# Patient Record
(demographics unavailable — no encounter records)

---

## 2024-10-29 NOTE — HISTORY OF PRESENT ILLNESS
[FreeTextEntry1] : Pt states he is here to estblish care. [de-identified] : 52M Chadian-speaking only with PMHx of HTN, HLD w/o prior hospitalizations, w/o prior surgeries, and on no medications right now, presenting to establish care with no acute clinical complaints or requests.  - Notably, he was hypertensive today to 170-180s/90s, but asymptomatic w/o headaches, dizziness, epistaxis, chest pain, dyspnea, SOB, neurologic changes, and he denies experiencing any of these symptoms recently. - In terms of HTN, claims that it's been high for years and was previously on meds Rx'd by unknown doctor in [place], but hasn't taken them in a while. - In terms of HLD, similarly states he was on meds by same doctor, but unsure what exactly and hasn't taken them in long time.

## 2024-10-29 NOTE — PHYSICAL EXAM
[Normal] : affect was normal and insight and judgment were intact [de-identified] : small white patch on iris on R eye, patient says is chronic

## 2024-10-29 NOTE — ASSESSMENT
[FreeTextEntry1] : 52M [place]ian-speaking only with PMHx of HTN, HLD w/o prior hospitalizations, w/o prior surgeries, and on no medications right now, presenting to [facility] care with no acute clinical complaints or requests  #HTN - having hypertensive urgency today, with subjective hx of HTN, took meds in past, but unsure what and not taking anything recently. has not had any symptoms of hypertensive emergency (including dizziness, headaches, chest pain, dyspnea, SOB, neurologic changes) - discussed with patient and wife (who speaks English) that he should take BP at home  Plan - given hypertensive urgency today, start combination HTN therapy with hydrochlorothiazide 12.5mg qD, losartan 25mg qD - CMP today for baseline electrolytes - home BP monitoring (wife stated that she will buy BP cuff) - f/u w/in 2 wks  #HLD - reported hx of hypercholesteremia  Plan - lipid panel today (didn't eat this morning) - CMP for LFTs - consider starting statin at next visit depending on LFTs - f/u w/in 2 wks  #HCM - prostate ca screening - PSA today - colon ca screening - never had colonoscopy, but not interested right now, but amenable to discussing at later date - lung ca screening- doesn't meet screening criteria, only has  approx 5 pack year hx - shingles immunization - not interested right now, but amenable to discussing at later date - flu immunization - not interested right now, but amenable to discussing at later date - covid immunization - already received it this year

## 2024-10-29 NOTE — END OF VISIT
[] : Resident [FreeTextEntry3] : Patient is here for an annual physical exam and follow-up for a history of medical conditions.  Patient has a history of hypertension and hypercholesterolemia and was previously treated with medications for both.  He has not been on any medications for quite some time.  His level of healthcare literacy appears to be low.  We will start medications as indicated in the resident's note and have him come back in 1 week to check his blood pressure again.  We also encouraged him to purchase a blood pressure device and check his blood pressure at home.  We will check his cholesterol to determine if he should be on a statin at this time.

## 2024-10-29 NOTE — HEALTH RISK ASSESSMENT
[Good] : ~his/her~  mood as  good [Yes] : Yes [2 - 4 times a month (2 pts)] : 2-4 times a month (2 points) [3 or 4 (1 pt)] : 3 or 4  (1 point) [Never (0 pts)] : Never (0 points) [No] : In the past 12 months have you used drugs other than those required for medical reasons? No [No falls in past year] : Patient reported no falls in the past year [1] : 1) Little interest or pleasure doing things for several days (1) [0] : 2) Feeling down, depressed, or hopeless: Not at all (0) [PHQ-2 Negative - No further assessment needed] : PHQ-2 Negative - No further assessment needed [With Significant Other] : lives with significant other [Employed] : employed [] :  [# Of Children ___] : has [unfilled] children [Sexually Active] : sexually active [Feels Safe at Home] : Feels safe at home [Fully functional (bathing, dressing, toileting, transferring, walking, feeding)] : Fully functional (bathing, dressing, toileting, transferring, walking, feeding) [Fully functional (using the telephone, shopping, preparing meals, housekeeping, doing laundry, using] : Fully functional and needs no help or supervision to perform IADLs (using the telephone, shopping, preparing meals, housekeeping, doing laundry, using transportation, managing medications and managing finances) [Reports normal functional visual acuity (ie: able to read med bottle)] : Reports normal functional visual acuity [Current] : Current [5-9] : 5-9 [Audit-CScore] : 3 [[id number]] : 1 [LowDoseCTScan] : unclear, possibly 2014, patient said was normal [High Risk Behavior] : no high risk behavior [Reports changes in hearing] : Reports no changes in hearing [Reports changes in vision] : Reports no changes in vision [ColonoscopyComments] : never had one [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

## 2024-11-12 NOTE — HEALTH RISK ASSESSMENT
Dr. Cruz [0] : 2) Feeling down, depressed, or hopeless: Not at all (0) [PHQ-9 Negative - No further assessment needed] : PHQ-9 Negative - No further assessment needed [Current] : Current

## 2024-11-12 NOTE — HEALTH RISK ASSESSMENT
[0] : 2) Feeling down, depressed, or hopeless: Not at all (0) [PHQ-9 Negative - No further assessment needed] : PHQ-9 Negative - No further assessment needed [Current] : Current

## 2024-11-12 NOTE — PHYSICAL EXAM
[No Acute Distress] : no acute distress [Well Nourished] : well nourished [Well Developed] : well developed [Well-Appearing] : well-appearing [EOMI] : extraocular movements intact [Normal Outer Ear/Nose] : the outer ears and nose were normal in appearance [No Respiratory Distress] : no respiratory distress  [No Accessory Muscle Use] : no accessory muscle use [Clear to Auscultation] : lungs were clear to auscultation bilaterally [Normal Rate] : normal rate  [Regular Rhythm] : with a regular rhythm [Soft] : abdomen soft [No Joint Swelling] : no joint swelling [No Rash] : no rash [Coordination Grossly Intact] : coordination grossly intact [No Focal Deficits] : no focal deficits [Normal Gait] : normal gait [Normal Affect] : the affect was normal [Normal Insight/Judgement] : insight and judgment were intact